# Patient Record
Sex: FEMALE | Race: WHITE | NOT HISPANIC OR LATINO | Employment: OTHER | URBAN - NONMETROPOLITAN AREA
[De-identification: names, ages, dates, MRNs, and addresses within clinical notes are randomized per-mention and may not be internally consistent; named-entity substitution may affect disease eponyms.]

---

## 2020-01-28 ENCOUNTER — TRANSCRIBE ORDERS (OUTPATIENT)
Dept: PODIATRY | Facility: CLINIC | Age: 30
End: 2020-01-28

## 2020-01-28 DIAGNOSIS — L60.8 DISCOLORATION OF NAIL: Primary | ICD-10-CM

## 2020-03-06 ENCOUNTER — OFFICE VISIT (OUTPATIENT)
Dept: PODIATRY | Facility: CLINIC | Age: 30
End: 2020-03-06

## 2020-03-06 VITALS — WEIGHT: 203 LBS | BODY MASS INDEX: 37.36 KG/M2 | OXYGEN SATURATION: 99 % | HEART RATE: 95 BPM | HEIGHT: 62 IN

## 2020-03-06 DIAGNOSIS — B35.1 ONYCHOMYCOSIS: Primary | ICD-10-CM

## 2020-03-06 PROCEDURE — 11755 BIOPSY NAIL UNIT: CPT | Performed by: PODIATRIST

## 2020-03-06 PROCEDURE — 99203 OFFICE O/P NEW LOW 30 MIN: CPT | Performed by: PODIATRIST

## 2020-03-06 NOTE — PROGRESS NOTES
Marjan Hall  1990  29 y.o. female     Patient presents today with a complaint of her right great toe having some skin issues around the nail bed.    03/06/2020    Chief Complaint   Patient presents with   • Right Foot - nail issue       History of Present Illness    Marjan Hall is a 29 y.o.female who presents to clinic today with chief complaint of right great toenail issue.  Patient states that the nail is thickened, discolored and crumbly.  This started approximately 2 years ago.  She does relate to injury to both great toes.  Toenail has progressively gotten worse over time.  It is not painful.  She has done nothing to treat it.  Patient does have history of psoriasis.  She has no other lower extremity complaints.      Past Medical History:   Diagnosis Date   • Psoriasis          Past Surgical History:   Procedure Laterality Date   • PILONIDAL CYST / SINUS EXCISION           Family History   Problem Relation Age of Onset   • Hypertension Father    • Cancer Maternal Grandmother    • Psoriasis Maternal Grandmother    • Diabetes Paternal Grandmother    • Heart disease Paternal Grandmother    • Hypertension Paternal Grandmother        Allergies   Allergen Reactions   • Gluten Meal Other (See Comments)   • Hydrocodone-Acetaminophen GI Intolerance   • Lactose Other (See Comments)   • Sulfa Antibiotics Rash       Social History     Socioeconomic History   • Marital status: Single     Spouse name: Not on file   • Number of children: Not on file   • Years of education: Not on file   • Highest education level: Not on file   Tobacco Use   • Smoking status: Former Smoker   • Smokeless tobacco: Never Used   Substance and Sexual Activity   • Alcohol use: Yes   • Drug use: Never   • Sexual activity: Defer         No current outpatient medications on file.     No current facility-administered medications for this visit.        Review of Systems   Constitutional: Negative.    HENT: Negative.    Eyes:  "Negative.    Respiratory: Negative.    Cardiovascular: Negative.    Gastrointestinal: Negative.    Endocrine: Negative.    Genitourinary: Negative.    Musculoskeletal: Positive for arthralgias.        Joint pain  General aches and pains     Psychiatric/Behavioral: Negative.          OBJECTIVE    Pulse 95   Ht 157.5 cm (62\")   Wt 92.1 kg (203 lb)   SpO2 99%   BMI 37.13 kg/m²       Physical Exam   Constitutional: She is oriented to person, place, and time. She appears well-developed and well-nourished. No distress.   HENT:   Head: Normocephalic and atraumatic.   Nose: Nose normal.   Eyes: Pupils are equal, round, and reactive to light. Conjunctivae and EOM are normal.   Pulmonary/Chest: Effort normal. No respiratory distress. She has no wheezes.   Musculoskeletal: Normal range of motion. She exhibits no edema or deformity.   Neurological: She is alert and oriented to person, place, and time.   Skin: Skin is warm and dry. Capillary refill takes less than 2 seconds.   Psychiatric: She has a normal mood and affect. Her behavior is normal.   Vitals reviewed.      Gait: normal     Assistive Device: none     Lower Extremity    Cardiovascular:    DP/PT pulses palpable bilateral  CFT brisk  to all digits  Skin temp is warm to warm from proximal tibia to distal digits bilateral  Pedal hair growth present.   No erythema or edema noted   Musculoskeletal:  Muscle strength is 5/5 for all muscle groups tested   ROM of the 1st MTP is WNL bilateral   ROM of the MTJ is WNL bilateral   ROM of the STJ is WNL bilateral   ROM of the ankle joint is  WNL bilateral   No pain on palpation noted bilateral  Dermatological:   Skin is warm, dry and intact bilateral  Webspaces 1-4 bilateral are clean, dry and intact.   No subcutaneous nodules or masses noted    Right hallux nail is thickened, discolored, elongated with subungual debris.  Neurological:   Protective sensation intact   Sensation intact to light touch        Procedures    Right " hallux nail biopsy  03/06/20  11:41 AM  Risks and benefits discussed  Part of nail plate removed with nail nippers  Nail bed scrapings taken with dermal curette  Specimen will be sent to [facility]  Patient tolerated the procedure well with no complications     ASSESSMENT AND PLAN    Marjan was seen today for nail issue.    Diagnoses and all orders for this visit:    Onychomycosis      - Comprehensive foot and ankle exam performed.  - Diagnosis, prevention and treatment of fungal toenails was discussed with the patient in detail.  Nail biopsy and treatment with oral fungal versus nail avulsions both temporary permanent versus regular debridements were discussed.  - Patient elected for nail biopsy at this time.  - All questions were answered and the patient is in agreement with the current treatment plan.  - RTC in 3 weeks          This document has been electronically signed by Yonatan Pastor DPM on March 6, 2020 11:41 AM     3/6/2020  11:41 AM

## 2020-03-23 ENCOUNTER — TELEPHONE (OUTPATIENT)
Dept: PODIATRY | Facility: CLINIC | Age: 30
End: 2020-03-23

## 2020-03-23 NOTE — TELEPHONE ENCOUNTER
PT RESCHEDULED UNTIL April.  WANTS A CALL BACK WITH HER TEST RESULTS IF POSSIBLE.  PLEASE CALL AT [phone]

## 2022-06-16 ENCOUNTER — INITIAL PRENATAL (OUTPATIENT)
Dept: OBSTETRICS AND GYNECOLOGY | Facility: CLINIC | Age: 32
End: 2022-06-16

## 2022-06-16 ENCOUNTER — LAB (OUTPATIENT)
Dept: LAB | Facility: HOSPITAL | Age: 32
End: 2022-06-16

## 2022-06-16 VITALS — BODY MASS INDEX: 34.93 KG/M2 | DIASTOLIC BLOOD PRESSURE: 80 MMHG | WEIGHT: 191 LBS | SYSTOLIC BLOOD PRESSURE: 140 MMHG

## 2022-06-16 DIAGNOSIS — Z12.4 SCREENING FOR MALIGNANT NEOPLASM OF CERVIX: ICD-10-CM

## 2022-06-16 DIAGNOSIS — L40.9 PSORIASIS: ICD-10-CM

## 2022-06-16 DIAGNOSIS — O09.291: ICD-10-CM

## 2022-06-16 DIAGNOSIS — Z36.89 ENCOUNTER FOR OTHER SPECIFIED ANTENATAL SCREENING: Primary | ICD-10-CM

## 2022-06-16 DIAGNOSIS — Z3A.01 LESS THAN 8 WEEKS GESTATION OF PREGNANCY: ICD-10-CM

## 2022-06-16 DIAGNOSIS — O10.911 MATERNAL CHRONIC HYPERTENSION IN FIRST TRIMESTER: ICD-10-CM

## 2022-06-16 DIAGNOSIS — O09.41 HIGH RISK MULTIGRAVIDA IN FIRST TRIMESTER: ICD-10-CM

## 2022-06-16 DIAGNOSIS — O10.911 MATERNAL CHRONIC HYPERTENSION IN FIRST TRIMESTER: Primary | ICD-10-CM

## 2022-06-16 LAB
AMPHET+METHAMPHET UR QL: NEGATIVE
AMPHETAMINES UR QL: NEGATIVE
B-HCG UR QL: POSITIVE
BARBITURATES UR QL SCN: NEGATIVE
BENZODIAZ UR QL SCN: NEGATIVE
BUPRENORPHINE SERPL-MCNC: NEGATIVE NG/ML
CANDIDA ALBICANS: NEGATIVE
CANNABINOIDS SERPL QL: NEGATIVE
COCAINE UR QL: NEGATIVE
EXPIRATION DATE: ABNORMAL
GARDNERELLA VAGINALIS: POSITIVE
INTERNAL NEGATIVE CONTROL: NEGATIVE
INTERNAL POSITIVE CONTROL: POSITIVE
Lab: ABNORMAL
Lab: NORMAL
METHADONE UR QL SCN: NEGATIVE
OPIATES UR QL: POSITIVE
OXYCODONE UR QL SCN: NEGATIVE
PCP UR QL SCN: NEGATIVE
PROPOXYPH UR QL: NEGATIVE
T VAGINALIS DNA VAG QL PROBE+SIG AMP: NEGATIVE
TRICYCLICS UR QL SCN: NEGATIVE

## 2022-06-16 PROCEDURE — G0480 DRUG TEST DEF 1-7 CLASSES: HCPCS | Performed by: NURSE PRACTITIONER

## 2022-06-16 PROCEDURE — 80306 DRUG TEST PRSMV INSTRMNT: CPT | Performed by: NURSE PRACTITIONER

## 2022-06-16 PROCEDURE — 81001 URINALYSIS AUTO W/SCOPE: CPT | Performed by: NURSE PRACTITIONER

## 2022-06-16 PROCEDURE — 87660 TRICHOMONAS VAGIN DIR PROBE: CPT | Performed by: NURSE PRACTITIONER

## 2022-06-16 PROCEDURE — 87661 TRICHOMONAS VAGINALIS AMPLIF: CPT | Performed by: NURSE PRACTITIONER

## 2022-06-16 PROCEDURE — 81025 URINE PREGNANCY TEST: CPT | Performed by: NURSE PRACTITIONER

## 2022-06-16 PROCEDURE — 87491 CHLMYD TRACH DNA AMP PROBE: CPT | Performed by: NURSE PRACTITIONER

## 2022-06-16 PROCEDURE — 99204 OFFICE O/P NEW MOD 45 MIN: CPT | Performed by: NURSE PRACTITIONER

## 2022-06-16 PROCEDURE — 36415 COLL VENOUS BLD VENIPUNCTURE: CPT

## 2022-06-16 PROCEDURE — 84156 ASSAY OF PROTEIN URINE: CPT | Performed by: NURSE PRACTITIONER

## 2022-06-16 PROCEDURE — 86803 HEPATITIS C AB TEST: CPT | Performed by: NURSE PRACTITIONER

## 2022-06-16 PROCEDURE — 80053 COMPREHEN METABOLIC PANEL: CPT | Performed by: NURSE PRACTITIONER

## 2022-06-16 PROCEDURE — 87591 N.GONORRHOEAE DNA AMP PROB: CPT | Performed by: NURSE PRACTITIONER

## 2022-06-16 PROCEDURE — 80081 OBSTETRIC PANEL INC HIV TSTG: CPT | Performed by: NURSE PRACTITIONER

## 2022-06-16 PROCEDURE — 82570 ASSAY OF URINE CREATININE: CPT | Performed by: NURSE PRACTITIONER

## 2022-06-16 PROCEDURE — 87480 CANDIDA DNA DIR PROBE: CPT | Performed by: NURSE PRACTITIONER

## 2022-06-16 PROCEDURE — 87510 GARDNER VAG DNA DIR PROBE: CPT | Performed by: NURSE PRACTITIONER

## 2022-06-16 NOTE — PROGRESS NOTES
[facility]  Obstetrics  Date of Service: 2022    CHIEF COMPLAINT:  New prenatal visit    HISTORY OF PRESENT ILLNESS:  Marjan Hall is a 31 y.o. y/o  at 6w4d by LMP (Patient's last menstrual period was 2022 (approximate).).  This was a planned pregnancy and the patient is supported by her parter.  Reports  nausea without vomiting.  Reports breast tenderness.  She denies any vaginal bleeding.  She has not started taking a prenatal vitamin.    REVIEW OF SYSTEMS  Review of Systems   Constitutional: Negative for activity change, appetite change, chills, diaphoresis, fatigue, fever, unexpected weight gain and unexpected weight loss.   Respiratory: Negative for chest tightness and shortness of breath.    Cardiovascular: Negative for chest pain and palpitations.   Gastrointestinal: Positive for nausea. Negative for abdominal distention, abdominal pain, constipation, diarrhea and vomiting.   Genitourinary: Negative for amenorrhea, breast discharge, breast lump, breast pain, decreased libido, decreased urine volume, dyspareunia, dysuria, frequency, menstrual problem, pelvic pain, pelvic pressure, urgency, urinary incontinence, vaginal bleeding, vaginal discharge and vaginal pain.   Musculoskeletal: Negative for myalgias.   Skin: Negative for color change, dry skin and skin lesions.   Neurological: Negative for light-headedness and headache.   Psychiatric/Behavioral: Negative for agitation, dysphoric mood, sleep disturbance, depressed mood and stress. The patient is not nervous/anxious.        PRENATAL RISK FACTORS   Problems (from 22 to present)     Problem Noted Resolved    High risk multigravida in first trimester 2022 by Kathie Omalley, JOSE MARTIN No    History of retained placenta in prior pregnancy, currently pregnant in first trimester 2022 by Kathie Omalley, APRN No    Maternal chronic hypertension in first trimester 2022 by Kathie Omalley, APRN  No          DATING CRITERIA:  LMP (uncertain, appx ) -- ABISAI 23  1TUS scheduled for     OBSTETRIC HISTORY:  OB History    Para Term  AB Living   5 1 1   3 1   SAB IAB Ectopic Molar Multiple Live Births             1      # Outcome Date GA Lbr Jae/2nd Weight Sex Delivery Anes PTL Lv   5 Current            4 AB            3 AB            2 AB            1 Term              GYN HISTORY:  Denies h/o sexually transmitted infections/pelvic inflammatory disease  Denies h/o abnormal pap smears  Last pap smear:   Last Completed Pap Smear          Ordered - PAP SMEAR (Every 3 Years) Ordered on 2022  LIQUID-BASED PAP SMEAR, [facility] ([facility])    2008  Converted (Historical) Gyn Cytology              Denies h/o gynecologic surgeries, including biopsies of the cervix    PAST MEDICAL HISTORY:  Past Medical History:   Diagnosis Date   • Psoriasis      PAST SURGICAL HISTORY:  Past Surgical History:   Procedure Laterality Date   • PILONIDAL CYST / SINUS EXCISION       FAMILY HISTORY:  Family History   Problem Relation Age of Onset   • Hypertension Father    • Cancer Maternal Grandmother    • Psoriasis Maternal Grandmother    • Diabetes Paternal Grandmother    • Heart disease Paternal Grandmother    • Hypertension Paternal Grandmother      SOCIAL HISTORY:  Social History     Socioeconomic History   • Marital status: Significant Other   Tobacco Use   • Smoking status: Former Smoker   • Smokeless tobacco: Never Used   Substance and Sexual Activity   • Alcohol use: Not Currently   • Drug use: Never   • Sexual activity: Defer     GENETIC SCREENING:  Age >34 yo as of ABISAI: no  Thalassemia: no  NTD: no  CHD: no  Down Syndrome/MR/Fragile X/Autism: no  Ashkenazi Yazidism with Berry-Sachs, [place], familial dysautonomia: no  Sickle cell disease or trait: no  Hemophilia: no  Muscular dystrophy: no  Cystic fibrosis: no  [place]'s chorea: no  Birth defects: no  Genetic/chromosomal disorders:  no    INFECTION HISTORY:  TB exposure: no  HSV: no  Illness since LMP: no  Prior GBS infected child: no  STIs: no    ALLERGIES:  Allergies   Allergen Reactions   • Clindamycin/Lincomycin Shortness Of Breath, Dizziness, Cough and Myalgia   • Gluten Meal Other (See Comments)   • Hydrocodone-Acetaminophen GI Intolerance   • Lactose Other (See Comments)   • Sulfa Antibiotics Rash       MEDICATIONS:  Prior to Admission medications    Not on File       PHYSICAL EXAM:   /80   Wt 86.6 kg (191 lb)   LMP 05/01/2022 (Approximate)   BMI 34.93 kg/m²   General: Alert, healthy, no distress, well nourished and well developed.  Neurologic: Alert, oriented to person, place, and time.  Gait normal.  Cranial nerves II-XII grossly intact.  HEENT: Normocephalic, atraumatic.  Extraocular muscles intact, pupils equal and reactive x2.    Teeth: Normal hygiene.  Neck: Supple, no adenopathy, thyroid normal size, non-tender, without nodularity, trachea midline.  Breasts: No masses, skin dimpling, skin retraction, nipple discharge, or asymmetry bilaterally.  Lungs: Normal respiratory effort.  Clear to auscultation bilaterally.  No wheezes, rhonci, or rales.  Heart: Regular rate and rhythm.  No murmer, rub or gallop.  Abdomen: Soft, non-tender, non-distended,no masses, no hepatosplenomegaly, no hernia.  Skin: No rash, no lesions.  Extremities: No cyanosis, clubbing or edema.  PELVIC EXAM:  External Genitalia/Vulva: Anatomy is normal, no significant redness of labia, no discharge on vulvar tissues, Sherrard's and Bartholin's glands are normal, no ulcers, no condylomatous lesions.  Urethra: Normal, no lesions.  Vagina: Vaginal tissues are not inflamed, normal color and texture, no significant discharge present.  Cervix: Normal in appearance without lesions or purulent discharge, no cervical motion tenderness. PAP OBTAINED  Uterus: Normal size, shape, and consistency.  Adnexa: Normal size and shape bilaterally, no palpable mass bilaterally  and non-tender bilaterally.    Bedside ultrasound performed by myself which shows the findings below:  Single, viable IUP around 6-7 weeks gestation with HFR of 157bpm.      IMPRESSION:  Marjan Hall is a 31 y.o.  at 6w4d for a new prenatal visit.    PLAN:  1.  IUP at 6w4d  - Options counseling performed and patient desires continuation of pregnancy to term   - Prenatal labs ordered  - Genetic testing, including cystic fibrosis, was discussed and patient is considering  - Start prenatal vitamins  - Weight gain counseling performed.   - Pregravid BMI 25-29.9: Recommend 15-25 lb  - Return to clinic in 4 weeks for return prenatal visit  - Reviewed COVID-19 visitation policy  - Reviewed COVID-19 precautions     Diagnosis Plan   1. Encounter for other specified  screening  Chlamydia trachomatis, Neisseria gonorrhoeae, Trichomonas vaginalis, PCR - Swab, Cervix    OB Panel With HIV    Urinalysis With Culture If Indicated - Urine, Clean Catch    Urine Drug Screen - Urine, Clean Catch    Gardnerella vaginalis, Trichomonas vaginalis, Candida albicans, DNA - Swab, Vagina    US Ob Transvaginal    POC Pregnancy, Urine    PREVIOUS HISTORY    Opiate Class, ToxAssure, UR - Urine, Clean Catch    Urinalysis, Microscopic Only - Urine, Clean Catch    Extra Tubes   2. Screening for malignant neoplasm of cervix  Liquid-based Pap Smear, Screening    LIQUID-BASED PAP SMEAR, [facility] ([facility])   3. Maternal chronic hypertension in first trimester  Comprehensive Metabolic Panel    Protein / Creatinine Ratio, Urine - Urine, Clean Catch    POC Pregnancy, Urine   4. High risk multigravida in first trimester     5. History of retained placenta in prior pregnancy, currently pregnant in first trimester     6. Less than 8 weeks gestation of pregnancy       JOSE MARTIN Johnson  2022  08:29 CDT

## 2022-06-17 LAB
ABO GROUP BLD: NORMAL
ALBUMIN SERPL-MCNC: 4.6 G/DL (ref 3.5–5.2)
ALBUMIN/GLOB SERPL: 1.7 G/DL
ALP SERPL-CCNC: 49 U/L (ref 39–117)
ALT SERPL W P-5'-P-CCNC: 16 U/L (ref 1–33)
ANION GAP SERPL CALCULATED.3IONS-SCNC: 12.6 MMOL/L (ref 5–15)
AST SERPL-CCNC: 17 U/L (ref 1–32)
BACTERIA UR QL AUTO: NORMAL /HPF
BASOPHILS # BLD AUTO: 0.05 10*3/MM3 (ref 0–0.2)
BASOPHILS NFR BLD AUTO: 0.5 % (ref 0–1.5)
BILIRUB SERPL-MCNC: 0.3 MG/DL (ref 0–1.2)
BILIRUB UR QL STRIP: NEGATIVE
BLD GP AB SCN SERPL QL: NEGATIVE
BUN SERPL-MCNC: 3 MG/DL (ref 6–20)
BUN/CREAT SERPL: 5.9 (ref 7–25)
C TRACH RRNA CVX QL NAA+PROBE: NEGATIVE
CALCIUM SPEC-SCNC: 9 MG/DL (ref 8.6–10.5)
CHLORIDE SERPL-SCNC: 102 MMOL/L (ref 98–107)
CLARITY UR: CLEAR
CO2 SERPL-SCNC: 20.4 MMOL/L (ref 22–29)
COLOR UR: YELLOW
CREAT SERPL-MCNC: 0.51 MG/DL (ref 0.57–1)
CREAT UR-MCNC: 7.3 MG/DL
DEPRECATED RDW RBC AUTO: 38.1 FL (ref 37–54)
EGFRCR SERPLBLD CKD-EPI 2021: 126.6 ML/MIN/1.73
EOSINOPHIL # BLD AUTO: 0.26 10*3/MM3 (ref 0–0.4)
EOSINOPHIL NFR BLD AUTO: 2.7 % (ref 0.3–6.2)
ERYTHROCYTE [DISTWIDTH] IN BLOOD BY AUTOMATED COUNT: 11.9 % (ref 12.3–15.4)
GLOBULIN UR ELPH-MCNC: 2.7 GM/DL
GLUCOSE SERPL-MCNC: 77 MG/DL (ref 65–99)
GLUCOSE UR STRIP-MCNC: NEGATIVE MG/DL
HBV SURFACE AG SERPL QL IA: NORMAL
HCT VFR BLD AUTO: 35.7 % (ref 34–46.6)
HCV AB SER DONR QL: NORMAL
HGB BLD-MCNC: 12.8 G/DL (ref 12–15.9)
HGB UR QL STRIP.AUTO: NEGATIVE
HIV1+2 AB SER QL: NORMAL
HOLD SPECIMEN: NORMAL
HYALINE CASTS UR QL AUTO: NORMAL /LPF
IMM GRANULOCYTES # BLD AUTO: 0.04 10*3/MM3 (ref 0–0.05)
IMM GRANULOCYTES NFR BLD AUTO: 0.4 % (ref 0–0.5)
KETONES UR QL STRIP: NEGATIVE
LEUKOCYTE ESTERASE UR QL STRIP.AUTO: ABNORMAL
LYMPHOCYTES # BLD AUTO: 2.08 10*3/MM3 (ref 0.7–3.1)
LYMPHOCYTES NFR BLD AUTO: 21.8 % (ref 19.6–45.3)
MCH RBC QN AUTO: 32.3 PG (ref 26.6–33)
MCHC RBC AUTO-ENTMCNC: 35.9 G/DL (ref 31.5–35.7)
MCV RBC AUTO: 90.2 FL (ref 79–97)
MONOCYTES # BLD AUTO: 0.74 10*3/MM3 (ref 0.1–0.9)
MONOCYTES NFR BLD AUTO: 7.7 % (ref 5–12)
N GONORRHOEA RRNA SPEC QL NAA+PROBE: NEGATIVE
NEUTROPHILS NFR BLD AUTO: 6.38 10*3/MM3 (ref 1.7–7)
NEUTROPHILS NFR BLD AUTO: 66.9 % (ref 42.7–76)
NITRITE UR QL STRIP: NEGATIVE
NRBC BLD AUTO-RTO: 0 /100 WBC (ref 0–0.2)
PH UR STRIP.AUTO: 7.5 [PH] (ref 5–8)
PLATELET # BLD AUTO: 236 10*3/MM3 (ref 140–450)
PMV BLD AUTO: 11.3 FL (ref 6–12)
POTASSIUM SERPL-SCNC: 3.9 MMOL/L (ref 3.5–5.2)
PROT ?TM UR-MCNC: <4 MG/DL
PROT SERPL-MCNC: 7.3 G/DL (ref 6–8.5)
PROT UR QL STRIP: NEGATIVE
PROT/CREAT UR: NORMAL MG/G{CREAT}
RBC # BLD AUTO: 3.96 10*6/MM3 (ref 3.77–5.28)
RBC # UR STRIP: NORMAL /HPF
REF LAB TEST METHOD: NORMAL
RH BLD: POSITIVE
RPR SER QL: NORMAL
SODIUM SERPL-SCNC: 135 MMOL/L (ref 136–145)
SP GR UR STRIP: <1.005 (ref 1–1.03)
SQUAMOUS #/AREA URNS HPF: NORMAL /HPF
TRICHOMONAS VAGINALIS PCR: NEGATIVE
UROBILINOGEN UR QL STRIP: ABNORMAL
WBC # UR STRIP: NORMAL /HPF
WBC NRBC COR # BLD: 9.55 10*3/MM3 (ref 3.4–10.8)

## 2022-06-18 LAB — RUBV IGG SERPL IA-ACNC: 1.47 INDEX

## 2022-06-20 DIAGNOSIS — Z36.89 ENCOUNTER FOR OTHER SPECIFIED ANTENATAL SCREENING: ICD-10-CM

## 2022-06-20 DIAGNOSIS — Z12.4 SCREENING FOR MALIGNANT NEOPLASM OF CERVIX: Primary | ICD-10-CM

## 2022-06-21 ENCOUNTER — TELEPHONE (OUTPATIENT)
Dept: OBSTETRICS AND GYNECOLOGY | Facility: CLINIC | Age: 32
End: 2022-06-21

## 2022-06-21 RX ORDER — METRONIDAZOLE 500 MG/1
500 TABLET ORAL 2 TIMES DAILY
Qty: 14 TABLET | Refills: 0 | Status: SHIPPED | OUTPATIENT
Start: 2022-06-21 | End: 2022-06-23

## 2022-06-21 NOTE — TELEPHONE ENCOUNTER
----- Message from JOSE MARTIN Peters sent at 6/20/2022  5:19 PM CDT -----  Covering for Kathie... Pt has BV; please order Flagyl 500mg BID for 7 days.

## 2022-06-22 ENCOUNTER — TELEPHONE (OUTPATIENT)
Dept: OBSTETRICS AND GYNECOLOGY | Facility: CLINIC | Age: 32
End: 2022-06-22

## 2022-06-22 NOTE — TELEPHONE ENCOUNTER
PT said the pharmacy told her she should probably not take the prescription given yesterday since she is pregnant. She wanted to see if there was something else she can take. PT can be reached at the number on file to discuss further is necessary.

## 2022-06-23 ENCOUNTER — TELEPHONE (OUTPATIENT)
Dept: OBSTETRICS AND GYNECOLOGY | Facility: CLINIC | Age: 32
End: 2022-06-23

## 2022-06-23 LAB — REF LAB TEST METHOD: NORMAL

## 2022-06-23 RX ORDER — CLINDAMYCIN HYDROCHLORIDE 300 MG/1
300 CAPSULE ORAL 2 TIMES DAILY
Qty: 14 CAPSULE | Refills: 0 | Status: SHIPPED | OUTPATIENT
Start: 2022-06-23 | End: 2022-06-27

## 2022-06-23 NOTE — TELEPHONE ENCOUNTER
Spoke to Rena regarding Flagyl rx for BV. Discussed risk and benefits of treatment and no treatment for symptomatic BV in pregnancy. Discussed risks and benefits of treatment options of Flagyl and Clindamycin. Pt desires Clindamycin PO antibiotics at this time. Rx sent to pharmacy.

## 2022-06-26 LAB
CODEINE/CREAT UR: 971 NG/MG CREAT
DHC/CREAT UR: NOT DETECTED NG/MG CREAT
HYDROCODONE/CREAT UR: NOT DETECTED NG/MG CREAT
HYDROMORPHONE/CREAT UR: NOT DETECTED NG/MG CREAT
LEVEL OF DETECTION:: NORMAL
MORPHINE/CREAT UR: NOT DETECTED NG/MG CREAT
NORCODEINE/CREAT UR CFM: NOT DETECTED NG/MG CREAT
NORHYDROCODONE/CREAT UR: NOT DETECTED NG/MG CREAT
NORMORPHINE UR-MCNC: NOT DETECTED NG/MG CREAT
OPIATES UR QL CFM: NORMAL

## 2022-06-28 PROBLEM — O09.291: Status: ACTIVE | Noted: 2022-06-28

## 2022-06-28 PROBLEM — O09.41 HIGH RISK MULTIGRAVIDA IN FIRST TRIMESTER: Status: ACTIVE | Noted: 2022-06-28

## 2022-07-14 ENCOUNTER — ROUTINE PRENATAL (OUTPATIENT)
Dept: OBSTETRICS AND GYNECOLOGY | Facility: CLINIC | Age: 32
End: 2022-07-14

## 2022-07-14 VITALS — WEIGHT: 185 LBS | BODY MASS INDEX: 33.84 KG/M2 | DIASTOLIC BLOOD PRESSURE: 82 MMHG | SYSTOLIC BLOOD PRESSURE: 120 MMHG

## 2022-07-14 DIAGNOSIS — O10.911 MATERNAL CHRONIC HYPERTENSION IN FIRST TRIMESTER: ICD-10-CM

## 2022-07-14 DIAGNOSIS — O09.41 HIGH RISK MULTIGRAVIDA IN FIRST TRIMESTER: Primary | ICD-10-CM

## 2022-07-14 DIAGNOSIS — O09.291: ICD-10-CM

## 2022-07-14 DIAGNOSIS — Z3A.10 10 WEEKS GESTATION OF PREGNANCY: ICD-10-CM

## 2022-07-14 PROCEDURE — 99213 OFFICE O/P EST LOW 20 MIN: CPT | Performed by: NURSE PRACTITIONER

## 2022-07-14 NOTE — PROGRESS NOTES
CC: Prenatal visit    Marjan Hall is a 31 y.o.  at 10w2d.  Doing well.  Denies N/V, dysuria, abnormal vaginal d/c, headaches, heartburn, constipation, cramping, LOF, or VB.      /82   Wt 83.9 kg (185 lb)   LMP 2022 (Approximate)   BMI 33.84 kg/m²   SVE: 4     Fetal Heart Rate: +vscan     Problems (from 22 to present)     Problem Noted Resolved    High risk multigravida in first trimester 2022 by Kathie Omalley APRN No    History of retained placenta in prior pregnancy, currently pregnant in first trimester 2022 by Kathie Omalley APRN No    Maternal chronic hypertension in first trimester 2022 by Kathie Omalley APRN No          A/P: Marjan Hall is a 31 y.o.  at 10w2d.  - RTC in 4 weeks  - Reviewed COVID-19 visitation policy  - Reviewed COVID-19 precautions     Diagnosis Plan   1. High risk multigravida in first trimester     2. Maternal chronic hypertension in first trimester     3. History of retained placenta in prior pregnancy, currently pregnant in first trimester     4. 10 weeks gestation of pregnancy       JSOE MARTIN Johnson  2022  13:34 CDT

## 2022-08-11 ENCOUNTER — ROUTINE PRENATAL (OUTPATIENT)
Dept: OBSTETRICS AND GYNECOLOGY | Facility: CLINIC | Age: 32
End: 2022-08-11

## 2022-08-11 VITALS — SYSTOLIC BLOOD PRESSURE: 120 MMHG | BODY MASS INDEX: 35.48 KG/M2 | WEIGHT: 194 LBS | DIASTOLIC BLOOD PRESSURE: 60 MMHG

## 2022-08-11 DIAGNOSIS — R82.5 POSITIVE URINE DRUG SCREEN: ICD-10-CM

## 2022-08-11 DIAGNOSIS — O10.912 MATERNAL CHRONIC HYPERTENSION IN SECOND TRIMESTER: ICD-10-CM

## 2022-08-11 DIAGNOSIS — Z36.3 ENCOUNTER FOR ANTENATAL SCREENING FOR MALFORMATION USING ULTRASOUND: ICD-10-CM

## 2022-08-11 DIAGNOSIS — O09.292 HISTORY OF RETAINED PLACENTA IN PRIOR PREGNANCY, CURRENTLY PREGNANT IN SECOND TRIMESTER: ICD-10-CM

## 2022-08-11 DIAGNOSIS — Z3A.14 14 WEEKS GESTATION OF PREGNANCY: ICD-10-CM

## 2022-08-11 DIAGNOSIS — O09.42 HIGH RISK MULTIGRAVIDA IN SECOND TRIMESTER: Primary | ICD-10-CM

## 2022-08-11 PROCEDURE — 99213 OFFICE O/P EST LOW 20 MIN: CPT | Performed by: NURSE PRACTITIONER

## 2022-08-11 RX ORDER — ASPIRIN 81 MG/1
81 TABLET ORAL DAILY
Qty: 90 TABLET | Refills: 2 | Status: SHIPPED | OUTPATIENT
Start: 2022-08-11 | End: 2022-10-12

## 2022-08-11 NOTE — PROGRESS NOTES
CC: Prenatal visit    Marjan Hall is a 31 y.o.  at 14w2d.  Doing well.  Denies dysuria, abnormal vaginal d/c, headaches, heartburn, constipation, cramping, LOF, or VB.  Reports good FM. Reviewed NOB lab results; showed positive for Opiates. Pt denies use of any illegal or legal substances. States that she eats a lot of poppy seeds and that she was on an antibiotic just prior to the UDS but cannot remember which one.    /60   Wt 88 kg (194 lb)   LMP 2022 (Approximate)   BMI 35.48 kg/m²   SVE: NA     Fetal Heart Rate: +vscan     Problems (from 22 to present)     Problem Noted Resolved    Positive urine drug screen 2022 by Kathie Omalley APRN No    Overview Signed 2022  2:12 PM by Kathie Omalley APRN     + opiates but pt denies use of any substances that could cause this to be positive. Notes abx use prior to testing and eats poppy seeds.         High risk multigravida in second trimester 2022 by Kathie Omalley APRN No    History of retained placenta in prior pregnancy, currently pregnant in second trimester 2022 by Kathie Omalley APRN No    Maternal chronic hypertension in second trimester 2022 by Kathie Omalley APRN No          A/P: Marjan Hall is a 31 y.o.  at 14w2d.  - RTC in 4 weeks  - Reviewed COVID-19 visitation policy  - Reviewed COVID-19 precautions     Diagnosis Plan   1. High risk multigravida in second trimester     2. Maternal chronic hypertension in second trimester  US Ob 14 + Weeks Single or First Gestation   3. History of retained placenta in prior pregnancy, currently pregnant in second trimester     4. Encounter for  screening for malformation using ultrasound  US Ob 14 + Weeks Single or First Gestation   5. 14 weeks gestation of pregnancy     6. Positive urine drug screen  Understands that she will continue to be tested and will have a consult on admission to L&D.     Kathie Omalley  APRN  8/11/2022  14:12 CDT

## 2022-09-14 ENCOUNTER — ROUTINE PRENATAL (OUTPATIENT)
Dept: OBSTETRICS AND GYNECOLOGY | Facility: CLINIC | Age: 32
End: 2022-09-14

## 2022-09-14 VITALS — WEIGHT: 191 LBS | SYSTOLIC BLOOD PRESSURE: 138 MMHG | DIASTOLIC BLOOD PRESSURE: 92 MMHG | BODY MASS INDEX: 34.93 KG/M2

## 2022-09-14 DIAGNOSIS — O09.42 HIGH RISK MULTIGRAVIDA IN SECOND TRIMESTER: Primary | ICD-10-CM

## 2022-09-14 DIAGNOSIS — O09.292 HISTORY OF RETAINED PLACENTA IN PRIOR PREGNANCY, CURRENTLY PREGNANT IN SECOND TRIMESTER: ICD-10-CM

## 2022-09-14 DIAGNOSIS — Z36.2 ENCOUNTER FOR OTHER ANTENATAL SCREENING FOLLOW-UP: ICD-10-CM

## 2022-09-14 DIAGNOSIS — Z3A.19 19 WEEKS GESTATION OF PREGNANCY: ICD-10-CM

## 2022-09-14 DIAGNOSIS — O10.912 MATERNAL CHRONIC HYPERTENSION IN SECOND TRIMESTER: ICD-10-CM

## 2022-09-14 DIAGNOSIS — R82.5 POSITIVE URINE DRUG SCREEN: ICD-10-CM

## 2022-09-14 DIAGNOSIS — R30.0 DYSURIA: ICD-10-CM

## 2022-09-14 PROCEDURE — 87660 TRICHOMONAS VAGIN DIR PROBE: CPT | Performed by: NURSE PRACTITIONER

## 2022-09-14 PROCEDURE — 99213 OFFICE O/P EST LOW 20 MIN: CPT | Performed by: NURSE PRACTITIONER

## 2022-09-14 PROCEDURE — 87510 GARDNER VAG DNA DIR PROBE: CPT | Performed by: NURSE PRACTITIONER

## 2022-09-14 PROCEDURE — 87480 CANDIDA DNA DIR PROBE: CPT | Performed by: NURSE PRACTITIONER

## 2022-09-14 PROCEDURE — 81001 URINALYSIS AUTO W/SCOPE: CPT | Performed by: NURSE PRACTITIONER

## 2022-09-14 NOTE — PROGRESS NOTES
CC: Prenatal visit    Marjan Hall is a 32 y.o.  at 19w1d.  Doing well.  Denies abnormal vaginal d/c, headaches, heartburn, constipation, cramping, regular contractions, LOF, or VB.  Reports good FM.    /92   Wt 86.6 kg (191 lb)   LMP 2022 (Approximate)   BMI 34.93 kg/m²   SVE: NA    Anatomy preliminary report reviewed. Subopts noted. Anatomy seen today noted to appear normal at this time. Posterior placenta w/o previa, with normal insertion of a 3VC. TACL- 4.7cm     Fetal Heart Rate: 145     Problems (from 22 to present)     Problem Noted Resolved    Positive urine drug screen 2022 by Kathie Omalley APRN No    Overview Signed 2022  2:12 PM by Kathie Omalley APRN     + opiates but pt denies use of any substances that could cause this to be positive. Notes abx use prior to testing and eats poppy seeds.         High risk multigravida in second trimester 2022 by Kathie Omalley APRN No    History of retained placenta in prior pregnancy, currently pregnant in second trimester 2022 by Kathie Omalley APRN No    Maternal chronic hypertension in second trimester 2022 by Kathie Omalley APRN No    Overview Signed 2022  3:55 PM by Kathie Omalley APRN     Pt refuses ASA               A/P: Marjan Hall is a 32 y.o.  at 19w1d.  - RTC in 3-4 weeks following subopts scan with [facility]  - Reviewed COVID-19 visitation policy  - Reviewed COVID-19 precautions     Diagnosis Plan   1. High risk multigravida in second trimester     2. History of retained placenta in prior pregnancy, currently pregnant in second trimester     3. Maternal chronic hypertension in second trimester     4. Positive urine drug screen     5. Dysuria  Gardnerella vaginalis, Trichomonas vaginalis, Candida albicans, DNA - Swab, Vagina    Urinalysis With Culture If Indicated - Urine, Random Void    Urinalysis, Microscopic Only - Urine, Random Void   6. 19 weeks gestation of  pregnancy       Kathie Omalley, APRN  9/20/2022  15:57 CDT

## 2022-09-15 LAB
BACTERIA UR QL AUTO: NORMAL /HPF
BILIRUB UR QL STRIP: NEGATIVE
CANDIDA ALBICANS: NEGATIVE
CLARITY UR: CLEAR
COLOR UR: YELLOW
GARDNERELLA VAGINALIS: NEGATIVE
GLUCOSE UR STRIP-MCNC: NEGATIVE MG/DL
HGB UR QL STRIP.AUTO: NEGATIVE
HYALINE CASTS UR QL AUTO: NORMAL /LPF
KETONES UR QL STRIP: NEGATIVE
LEUKOCYTE ESTERASE UR QL STRIP.AUTO: ABNORMAL
NITRITE UR QL STRIP: NEGATIVE
PH UR STRIP.AUTO: 7.5 [PH] (ref 5–8)
PROT UR QL STRIP: NEGATIVE
RBC # UR STRIP: NORMAL /HPF
REF LAB TEST METHOD: NORMAL
SP GR UR STRIP: <1.005 (ref 1–1.03)
SQUAMOUS #/AREA URNS HPF: NORMAL /HPF
T VAGINALIS DNA VAG QL PROBE+SIG AMP: NEGATIVE
UROBILINOGEN UR QL STRIP: ABNORMAL
WBC # UR STRIP: NORMAL /HPF

## 2022-09-19 DIAGNOSIS — O10.912 MATERNAL CHRONIC HYPERTENSION IN SECOND TRIMESTER: ICD-10-CM

## 2022-09-19 DIAGNOSIS — Z36.3 ENCOUNTER FOR ANTENATAL SCREENING FOR MALFORMATION USING ULTRASOUND: ICD-10-CM

## 2022-09-28 ENCOUNTER — TELEPHONE (OUTPATIENT)
Dept: OBSTETRICS AND GYNECOLOGY | Facility: CLINIC | Age: 32
End: 2022-09-28

## 2022-09-28 NOTE — TELEPHONE ENCOUNTER
PATIENT CALLED AND WENT TO THE ER YESTERDAY FOR A FALL THAT SHE HAD. SHE CHECKED OUT OK BUT THEY DID TELL HER TO FOLLOW UP WITH HER PROVIDER HERE. HER NUMBER TO CALL BACK [phone].          THANK YOU,        TAE

## 2022-09-28 NOTE — TELEPHONE ENCOUNTER
Spoke to pt regarding fall. Denies vaginal bleeding or decreased FM. Feeling sore today but overall no issues. Precautions reviewed. Keep previously scheduled OB appt for early Oct.

## 2022-10-12 ENCOUNTER — ROUTINE PRENATAL (OUTPATIENT)
Dept: OBSTETRICS AND GYNECOLOGY | Facility: CLINIC | Age: 32
End: 2022-10-12

## 2022-10-12 VITALS — DIASTOLIC BLOOD PRESSURE: 70 MMHG | WEIGHT: 197 LBS | BODY MASS INDEX: 36.03 KG/M2 | SYSTOLIC BLOOD PRESSURE: 124 MMHG

## 2022-10-12 DIAGNOSIS — O10.912 MATERNAL CHRONIC HYPERTENSION IN SECOND TRIMESTER: ICD-10-CM

## 2022-10-12 DIAGNOSIS — O09.42 HIGH RISK MULTIGRAVIDA IN SECOND TRIMESTER: Primary | ICD-10-CM

## 2022-10-12 DIAGNOSIS — O09.292 HISTORY OF RETAINED PLACENTA IN PRIOR PREGNANCY, CURRENTLY PREGNANT IN SECOND TRIMESTER: ICD-10-CM

## 2022-10-12 DIAGNOSIS — R82.5 POSITIVE URINE DRUG SCREEN: ICD-10-CM

## 2022-10-12 DIAGNOSIS — Z3A.23 23 WEEKS GESTATION OF PREGNANCY: ICD-10-CM

## 2022-10-12 PROCEDURE — 99214 OFFICE O/P EST MOD 30 MIN: CPT | Performed by: NURSE PRACTITIONER

## 2022-10-12 NOTE — PROGRESS NOTES
CC: Prenatal visit    Marjan Hall is a 32 y.o.  at 23w1d.  Doing well.  Denies contractions, LOF, or VB.  Reports good FM.    /70   Wt 89.4 kg (197 lb)   LMP 2022 (Approximate)   BMI 36.03 kg/m²   SVE: NA      Prelim anatomy scan f/u for subopts: All subopts seen and noted appear normal at this time. Cervix 3.8cm. FHR: 138. Cephalic. Placenta posterior. LEONARDO.: Subjectively normal. EFW: 616gm, 1lb 6oz  Fetal Heart Rate: 138us     Problems (from 22 to present)     Problem Noted Resolved    Positive urine drug screen 2022 by Kathie Omalley APRN No    Overview Signed 2022  2:12 PM by Kathie Omalley APRN     + opiates but pt denies use of any substances that could cause this to be positive. Notes abx use prior to testing and eats poppy seeds.         High risk multigravida in second trimester 2022 by Kathie Omalley APRN No    History of retained placenta in prior pregnancy, currently pregnant in second trimester 2022 by Kathie Omalley APRN No    Maternal chronic hypertension in second trimester 2022 by Kathie Omalley APRN No    Overview Signed 2022  3:55 PM by Kathie Omalley APRN     Pt refuses ASA               A/P: Marjan Hall is a 32 y.o.  at 23w1d.  - RTC in 4 weeks, 3T labs, glucola and Tdap  - Reviewed COVID-19 visitation policy  - Reviewed COVID-19 precautions     Diagnosis Plan   1. High risk multigravida in second trimester        2. 23 weeks gestation of pregnancy        3. History of retained placenta in prior pregnancy, currently pregnant in second trimester        4. Maternal chronic hypertension in second trimester        5. Positive urine drug screen        Scribed for JOSE MARTIN Johnson by JOSE MARTIN Brand. 10/12/2022  11:56 CDT    JOSE MARTIN Johnson  10/12/2022  11:56 CDT

## 2022-10-13 DIAGNOSIS — Z36.2 ENCOUNTER FOR OTHER ANTENATAL SCREENING FOLLOW-UP: ICD-10-CM

## 2022-11-16 ENCOUNTER — ROUTINE PRENATAL (OUTPATIENT)
Dept: OBSTETRICS AND GYNECOLOGY | Facility: CLINIC | Age: 32
End: 2022-11-16

## 2022-11-16 ENCOUNTER — LAB (OUTPATIENT)
Dept: LAB | Facility: HOSPITAL | Age: 32
End: 2022-11-16

## 2022-11-16 VITALS — SYSTOLIC BLOOD PRESSURE: 130 MMHG | BODY MASS INDEX: 37.13 KG/M2 | DIASTOLIC BLOOD PRESSURE: 70 MMHG | WEIGHT: 203 LBS

## 2022-11-16 DIAGNOSIS — O09.43 HIGH RISK MULTIGRAVIDA IN THIRD TRIMESTER: Primary | ICD-10-CM

## 2022-11-16 DIAGNOSIS — O09.293: ICD-10-CM

## 2022-11-16 DIAGNOSIS — R82.5 POSITIVE URINE DRUG SCREEN: ICD-10-CM

## 2022-11-16 DIAGNOSIS — Z36.89 ENCOUNTER FOR OTHER SPECIFIED ANTENATAL SCREENING: ICD-10-CM

## 2022-11-16 DIAGNOSIS — O10.913 MATERNAL CHRONIC HYPERTENSION IN THIRD TRIMESTER: ICD-10-CM

## 2022-11-16 DIAGNOSIS — O99.810 ABNORMAL GLUCOSE AFFECTING PREGNANCY: Primary | ICD-10-CM

## 2022-11-16 DIAGNOSIS — Z3A.28 28 WEEKS GESTATION OF PREGNANCY: ICD-10-CM

## 2022-11-16 LAB
ALBUMIN SERPL-MCNC: 3.7 G/DL (ref 3.5–5.2)
ALBUMIN/GLOB SERPL: 1.4 G/DL
ALP SERPL-CCNC: 43 U/L (ref 39–117)
ALT SERPL W P-5'-P-CCNC: 12 U/L (ref 1–33)
ANION GAP SERPL CALCULATED.3IONS-SCNC: 11 MMOL/L (ref 5–15)
AST SERPL-CCNC: 14 U/L (ref 1–32)
BILIRUB SERPL-MCNC: 0.2 MG/DL (ref 0–1.2)
BUN SERPL-MCNC: 3 MG/DL (ref 6–20)
BUN/CREAT SERPL: 6.8 (ref 7–25)
CALCIUM SPEC-SCNC: 9 MG/DL (ref 8.6–10.5)
CHLORIDE SERPL-SCNC: 102 MMOL/L (ref 98–107)
CO2 SERPL-SCNC: 23 MMOL/L (ref 22–29)
CREAT SERPL-MCNC: 0.44 MG/DL (ref 0.57–1)
DEPRECATED RDW RBC AUTO: 43.8 FL (ref 37–54)
EGFRCR SERPLBLD CKD-EPI 2021: 130.4 ML/MIN/1.73
ERYTHROCYTE [DISTWIDTH] IN BLOOD BY AUTOMATED COUNT: 12.8 % (ref 12.3–15.4)
GLOBULIN UR ELPH-MCNC: 2.6 GM/DL
GLUCOSE 1H P 100 G GLC PO SERPL-MCNC: 120 MG/DL (ref 65–139)
GLUCOSE SERPL-MCNC: 120 MG/DL (ref 65–99)
HCT VFR BLD AUTO: 32.8 % (ref 34–46.6)
HGB BLD-MCNC: 11.1 G/DL (ref 12–15.9)
MCH RBC QN AUTO: 32.2 PG (ref 26.6–33)
MCHC RBC AUTO-ENTMCNC: 33.8 G/DL (ref 31.5–35.7)
MCV RBC AUTO: 95.1 FL (ref 79–97)
PLATELET # BLD AUTO: 151 10*3/MM3 (ref 140–450)
PMV BLD AUTO: 12 FL (ref 6–12)
POTASSIUM SERPL-SCNC: 3.9 MMOL/L (ref 3.5–5.2)
PROT SERPL-MCNC: 6.3 G/DL (ref 6–8.5)
RBC # BLD AUTO: 3.45 10*6/MM3 (ref 3.77–5.28)
SODIUM SERPL-SCNC: 136 MMOL/L (ref 136–145)
WBC NRBC COR # BLD: 10.68 10*3/MM3 (ref 3.4–10.8)

## 2022-11-16 PROCEDURE — 80053 COMPREHEN METABOLIC PANEL: CPT | Performed by: NURSE PRACTITIONER

## 2022-11-16 PROCEDURE — 99214 OFFICE O/P EST MOD 30 MIN: CPT | Performed by: NURSE PRACTITIONER

## 2022-11-16 PROCEDURE — 82950 GLUCOSE TEST: CPT | Performed by: NURSE PRACTITIONER

## 2022-11-16 PROCEDURE — 85027 COMPLETE CBC AUTOMATED: CPT | Performed by: NURSE PRACTITIONER

## 2022-11-16 NOTE — PROGRESS NOTES
CC: Prenatal visit    Marjan Hall is a 32 y.o.  at 28w1d.  Doing well.  Denies N/V, dysuria, abnormal vaginal d/c, headaches, heartburn, constipation, cramping, regular contractions, LOF, or VB.  Reports good FM.    /70   Wt 92.1 kg (203 lb)   LMP 2022 (Approximate)   BMI 37.13 kg/m²   SVE: NA    F/U scan for subopt anatomy and growth reviewed today. All previously suboptimal images obtained and noted to appear normal at this time. EFW- 1213g, 45%tile. AC- 62%tile. BPP- . LEONARDO- 17.91cm     Fetal Heart Rate: 143     Problems (from 22 to present)     Problem Noted Resolved    Positive urine drug screen 2022 by Kathie Omalley APRN No    Overview Signed 2022  2:12 PM by Kathie Omalley APRN     + opiates but pt denies use of any substances that could cause this to be positive. Notes abx use prior to testing and eats poppy seeds.         High risk multigravida in third trimester 2022 by Kathie Omalley APRN No    History of retained placenta in prior pregnancy, currently pregnant, third trimester 2022 by Kathie Omalley APRN No    Maternal chronic hypertension in third trimester 2022 by Kathie Omalley APRN No    Overview Signed 2022  3:55 PM by Kathie Omalley APRN     Pt refuses ASA               A/P: Marjan Hall is a 32 y.o.  at 28w1d.  - RTC in 4 weeks  - Reviewed COVID-19 visitation policy  - Reviewed COVID-19 precautions     Diagnosis Plan   1. High risk multigravida in third trimester        2. History of retained placenta in prior pregnancy, currently pregnant, third trimester        3. Maternal chronic hypertension in third trimester  Comprehensive Metabolic Panel  PreE precautions reviewed. States that she can request a BP cuff from her insurance; speaks to a rep weekly for pregnancy updates and was told they would send her one anytime she needed it.      4. Positive urine drug screen        5. Encounter for other  specified  screening  CBC (No Diff)    Glucose, Post 50 Gm Glucola      6. 28 weeks gestation of pregnancy          Kathie Omalley, JOSE MARTIN  2022  09:33 CST

## 2022-11-30 DIAGNOSIS — O09.293: ICD-10-CM

## 2022-11-30 DIAGNOSIS — O10.913 MATERNAL CHRONIC HYPERTENSION IN THIRD TRIMESTER: ICD-10-CM

## 2022-11-30 DIAGNOSIS — O09.43 HIGH RISK MULTIGRAVIDA IN THIRD TRIMESTER: ICD-10-CM

## 2022-11-30 DIAGNOSIS — O99.810 ABNORMAL GLUCOSE AFFECTING PREGNANCY: Primary | ICD-10-CM

## 2022-12-01 DIAGNOSIS — O09.43 HIGH RISK MULTIGRAVIDA IN THIRD TRIMESTER: ICD-10-CM

## 2022-12-01 DIAGNOSIS — O09.293: ICD-10-CM

## 2022-12-01 DIAGNOSIS — O10.913 MATERNAL CHRONIC HYPERTENSION IN THIRD TRIMESTER: ICD-10-CM

## 2022-12-01 DIAGNOSIS — O99.810 ABNORMAL GLUCOSE AFFECTING PREGNANCY: ICD-10-CM

## 2022-12-14 ENCOUNTER — ROUTINE PRENATAL (OUTPATIENT)
Dept: OBSTETRICS AND GYNECOLOGY | Facility: CLINIC | Age: 32
End: 2022-12-14

## 2022-12-14 ENCOUNTER — PATIENT MESSAGE (OUTPATIENT)
Dept: OBSTETRICS AND GYNECOLOGY | Facility: CLINIC | Age: 32
End: 2022-12-14

## 2022-12-14 VITALS — WEIGHT: 207 LBS | DIASTOLIC BLOOD PRESSURE: 70 MMHG | SYSTOLIC BLOOD PRESSURE: 126 MMHG | BODY MASS INDEX: 37.86 KG/M2

## 2022-12-14 DIAGNOSIS — Z3A.32 32 WEEKS GESTATION OF PREGNANCY: ICD-10-CM

## 2022-12-14 DIAGNOSIS — O10.913 MATERNAL CHRONIC HYPERTENSION IN THIRD TRIMESTER: ICD-10-CM

## 2022-12-14 DIAGNOSIS — O09.43 HIGH RISK MULTIGRAVIDA IN THIRD TRIMESTER: Primary | ICD-10-CM

## 2022-12-14 DIAGNOSIS — R82.5 POSITIVE URINE DRUG SCREEN: ICD-10-CM

## 2022-12-14 DIAGNOSIS — O09.293: ICD-10-CM

## 2022-12-14 PROCEDURE — 99213 OFFICE O/P EST LOW 20 MIN: CPT | Performed by: NURSE PRACTITIONER

## 2022-12-14 NOTE — TELEPHONE ENCOUNTER
From: Marjan Hall  To: JOSE MARTIN Johnson  Sent: 12/14/2022 11:31 AM CST  Subject: Ultrasound Followup Question    Viet Vázquez,    I was looking over the measurements from today’s ultrasound and had a question. Our baby’s head is measuring 85th percentile and humerus/femur length are 19th and 11th percentile. We talked about him having a big head, but are the proportions ok? Are there any concerns with that in regards to any type of dysplasia? I don’t want to be unnecessarily concerned, and know it’s not a huge difference now but easily could be. How did measurements look for you? Is that still pretty normal-ana? Thank you!!

## 2022-12-14 NOTE — PROGRESS NOTES
CC: Prenatal visit    Marjan Hall is a 32 y.o.  at 32w1d.  Doing well.  Denies N/V, dysuria, abnormal vaginal d/c, headaches, heartburn, constipation, cramping, regular contractions, LOF, or VB.  Reports good FM.    /70   Wt 93.9 kg (207 lb)   LMP 2022 (Approximate)   BMI 37.86 kg/m²   SVE: NA    EFW- 1966g, 47%tile. AC- 62%tile. LEONARDO- 147bpm. BPP-      Fetal Heart Rate: 147     Problems (from 22 to present)     Problem Noted Resolved    Positive urine drug screen 2022 by Kathie Omalley APRN No    Overview Signed 2022  2:12 PM by Kathie Omalley APRN     + opiates but pt denies use of any substances that could cause this to be positive. Notes abx use prior to testing and eats poppy seeds.         High risk multigravida in third trimester 2022 by Kathie Omalley APRN No    History of retained placenta in prior pregnancy, currently pregnant, third trimester 2022 by Kathie Omalley APRN No    Maternal chronic hypertension in third trimester 2022 by Kathie Omalley APRN No    Overview Signed 2022  3:55 PM by Kathie Omalley APRN     Pt refuses ASA               A/P: Marjan Hall is a 32 y.o.  at 32w1d.  - RTC in 1 week  - start checking BP once daily and additionally if feeling unwell. Reviewed s/s of PreE or BP ranges to report.   - Reviewed COVID-19 visitation policy  - Reviewed COVID-19 precautions     Diagnosis Plan   1. High risk multigravida in third trimester        2. History of retained placenta in prior pregnancy, currently pregnant, third trimester        3. Maternal chronic hypertension in third trimester  Continue weekly BPPs and GS q4 wks.      4. Positive urine drug screen        5. 32 weeks gestation of pregnancy  Does not want to be induced unless absolutely medically necessary.         JOSE MARTIN Johsnon  2022  16:15 CST

## 2022-12-21 ENCOUNTER — ROUTINE PRENATAL (OUTPATIENT)
Dept: OBSTETRICS AND GYNECOLOGY | Facility: CLINIC | Age: 32
End: 2022-12-21

## 2022-12-21 VITALS — SYSTOLIC BLOOD PRESSURE: 124 MMHG | BODY MASS INDEX: 38.04 KG/M2 | DIASTOLIC BLOOD PRESSURE: 80 MMHG | WEIGHT: 208 LBS

## 2022-12-21 DIAGNOSIS — O10.913 MATERNAL CHRONIC HYPERTENSION IN THIRD TRIMESTER: ICD-10-CM

## 2022-12-21 DIAGNOSIS — O09.43 HIGH RISK MULTIGRAVIDA IN THIRD TRIMESTER: ICD-10-CM

## 2022-12-21 DIAGNOSIS — O99.810 ABNORMAL GLUCOSE AFFECTING PREGNANCY: ICD-10-CM

## 2022-12-21 DIAGNOSIS — O09.293: ICD-10-CM

## 2022-12-21 DIAGNOSIS — Z3A.33 33 WEEKS GESTATION OF PREGNANCY: ICD-10-CM

## 2022-12-21 DIAGNOSIS — O09.43 HIGH RISK MULTIGRAVIDA IN THIRD TRIMESTER: Primary | ICD-10-CM

## 2022-12-21 DIAGNOSIS — R82.5 POSITIVE URINE DRUG SCREEN: ICD-10-CM

## 2022-12-21 PROCEDURE — 99213 OFFICE O/P EST LOW 20 MIN: CPT | Performed by: NURSE PRACTITIONER

## 2022-12-21 NOTE — PROGRESS NOTES
CC: Prenatal visit    Marjan Hall is a 32 y.o.  at 33w1d.  Doing well.  Denies N/V, dysuria, abnormal vaginal d/c, headaches, heartburn, constipation, cramping, regular contractions, LOF, or VB.  Reports good FM.    States BP at home has been 120-130s/70-80s    BPP- , LEONARDO- 12.32cm    /80   Wt 94.3 kg (208 lb)   LMP 2022 (Approximate)   BMI 38.04 kg/m²   SVE: NA     Fetal Heart Rate: 158     Problems (from 22 to present)     Problem Noted Resolved    Positive urine drug screen 2022 by Kathie Omalley APRN No    Overview Signed 2022  2:12 PM by Kathie Omalley APRN     + opiates but pt denies use of any substances that could cause this to be positive. Notes abx use prior to testing and eats poppy seeds.         High risk multigravida in third trimester 2022 by Kathie Omalley APRN No    History of retained placenta in prior pregnancy, currently pregnant, third trimester 2022 by Kathie Omalley APRN No    Maternal chronic hypertension in third trimester 2022 by Kathie Omalley APRN No    Overview Signed 2022  3:55 PM by Kathie Omalley APRN     Pt refuses ASA               A/P: Marjan Hall is a 32 y.o.  at 33w1d.  - RTC in 2 weeks  - Will have BPP next week but no one will be in office in [facility]. Will have [facility] call for any concerns or send pt to [place].  - Reviewed COVID-19 visitation policy  - Reviewed COVID-19 precautions     Diagnosis Plan   1. High risk multigravida in third trimester        2. History of retained placenta in prior pregnancy, currently pregnant, third trimester        3. Maternal chronic hypertension in third trimester        4. Positive urine drug screen        5. 33 weeks gestation of pregnancy          JOSE MARTIN Johnson  2022  10:20 CST

## 2023-01-04 DIAGNOSIS — O99.810 ABNORMAL GLUCOSE AFFECTING PREGNANCY: ICD-10-CM

## 2023-01-04 DIAGNOSIS — O10.913 MATERNAL CHRONIC HYPERTENSION IN THIRD TRIMESTER: ICD-10-CM

## 2023-01-04 DIAGNOSIS — O09.293: ICD-10-CM

## 2023-01-04 DIAGNOSIS — O09.43 HIGH RISK MULTIGRAVIDA IN THIRD TRIMESTER: Primary | ICD-10-CM

## 2023-01-05 ENCOUNTER — ROUTINE PRENATAL (OUTPATIENT)
Dept: OBSTETRICS AND GYNECOLOGY | Facility: CLINIC | Age: 33
End: 2023-01-05
Payer: MEDICAID

## 2023-01-05 VITALS — BODY MASS INDEX: 38.41 KG/M2 | WEIGHT: 210 LBS | SYSTOLIC BLOOD PRESSURE: 132 MMHG | DIASTOLIC BLOOD PRESSURE: 70 MMHG

## 2023-01-05 DIAGNOSIS — O10.913 MATERNAL CHRONIC HYPERTENSION IN THIRD TRIMESTER: ICD-10-CM

## 2023-01-05 DIAGNOSIS — Z3A.35 35 WEEKS GESTATION OF PREGNANCY: Primary | ICD-10-CM

## 2023-01-05 DIAGNOSIS — R82.5 POSITIVE URINE DRUG SCREEN: ICD-10-CM

## 2023-01-05 DIAGNOSIS — O09.293: ICD-10-CM

## 2023-01-05 DIAGNOSIS — O09.43 HIGH RISK MULTIGRAVIDA IN THIRD TRIMESTER: ICD-10-CM

## 2023-01-05 PROCEDURE — 99213 OFFICE O/P EST LOW 20 MIN: CPT | Performed by: NURSE PRACTITIONER

## 2023-01-05 NOTE — PROGRESS NOTES
CC: Prenatal visit    Marjan Hall is a 32 y.o.  at 35w2d.   Denies N/V, dysuria, abnormal vaginal d/c, headaches, heartburn, constipation, cramping, regular contractions, LOF, or VB.  Reports good FM. Pt was has been sick this past week with cough but improving. Tested negative for flu and covid.    /70   Wt 95.3 kg (210 lb)   LMP 2022 (Approximate)   BMI 38.41 kg/m²   SVE: Deferred  Fundal Height (cm): 36 cm  Fetal Heart Rate: 143     US: BPP . LEONARDO 8.69cm. Cephalic.      Problems (from 22 to present)     Problem Noted Resolved    Positive urine drug screen 2022 by Kathie Omalley APRN No    Overview Signed 2022  2:12 PM by Kathie Omalley APRN     + opiates but pt denies use of any substances that could cause this to be positive. Notes abx use prior to testing and eats poppy seeds.         High risk multigravida in third trimester 2022 by Kathie Omalley APRN No    History of retained placenta in prior pregnancy, currently pregnant, third trimester 2022 by Kathie Omalley APRN No    Maternal chronic hypertension in third trimester 2022 by Kathie Omalley APRN No    Overview Signed 2022  3:55 PM by Kathie Omalley APRN     Pt refuses ASA               A/P: Marjan Hall is a 32 y.o.  at 35w2d.  - Reassuring BPP  - GBS swab next visit  - RTC in 1 week     Diagnosis Plan   1. High risk multigravida in third trimester        2. History of retained placenta in prior pregnancy, currently pregnant, third trimester        3. Maternal chronic hypertension in third trimester        4. Positive urine drug screen        5. 35 weeks gestation of pregnancy          JOSE MARTIN Rosen  2023  09:13 CST

## 2023-01-09 ENCOUNTER — ROUTINE PRENATAL (OUTPATIENT)
Dept: OBSTETRICS AND GYNECOLOGY | Facility: CLINIC | Age: 33
End: 2023-01-09
Payer: MEDICAID

## 2023-01-09 VITALS — DIASTOLIC BLOOD PRESSURE: 80 MMHG | WEIGHT: 212 LBS | SYSTOLIC BLOOD PRESSURE: 126 MMHG | BODY MASS INDEX: 38.78 KG/M2

## 2023-01-09 DIAGNOSIS — O09.293: ICD-10-CM

## 2023-01-09 DIAGNOSIS — O09.43 HIGH RISK MULTIGRAVIDA IN THIRD TRIMESTER: Primary | ICD-10-CM

## 2023-01-09 DIAGNOSIS — O99.810 ABNORMAL GLUCOSE AFFECTING PREGNANCY: ICD-10-CM

## 2023-01-09 DIAGNOSIS — R82.5 POSITIVE URINE DRUG SCREEN: ICD-10-CM

## 2023-01-09 DIAGNOSIS — O10.913 MATERNAL CHRONIC HYPERTENSION IN THIRD TRIMESTER: ICD-10-CM

## 2023-01-09 DIAGNOSIS — O09.43 HIGH RISK MULTIGRAVIDA IN THIRD TRIMESTER: ICD-10-CM

## 2023-01-09 DIAGNOSIS — Z3A.35 35 WEEKS GESTATION OF PREGNANCY: ICD-10-CM

## 2023-01-09 PROCEDURE — 99213 OFFICE O/P EST LOW 20 MIN: CPT | Performed by: NURSE PRACTITIONER

## 2023-01-09 NOTE — PROGRESS NOTES
CC: Prenatal visit    Marjan Hall is a 32 y.o.  at 35w6d.  Doing well.  Denies N/V, dysuria, abnormal vaginal d/c, headaches, heartburn, constipation, cramping, regular contractions, LOF, or VB.  Reports good FM.    /80   Wt 96.2 kg (212 lb)   LMP 2022 (Approximate)   BMI 38.78 kg/m²   SVE: NA    EFW- 2841g, 57%tile. AC- 81%tile. LEONARDO- 11.54cm.   BPP-      Fetal Heart Rate: 145     Problems (from 22 to present)     Problem Noted Resolved    Positive urine drug screen 2022 by Kathie Omalley APRN No    Overview Signed 2022  2:12 PM by Kathie Omalley APRN     + opiates but pt denies use of any substances that could cause this to be positive. Notes abx use prior to testing and eats poppy seeds.         High risk multigravida in third trimester 2022 by Kathie Omalley APRN No    History of retained placenta in prior pregnancy, currently pregnant, third trimester 2022 by Kathie Oamlley APRN No    Maternal chronic hypertension in third trimester 2022 by Kathie Omalley APRN No    Overview Signed 2022  3:55 PM by Kathie Omalley APRN     Pt refuses ASA               A/P: Marjan Hall is a 32 y.o.  at 35w6d.  - RTC in 1 week  - Reviewed COVID-19 visitation policy  - Reviewed COVID-19 precautions     Diagnosis Plan   1. High risk multigravida in third trimester        2. History of retained placenta in prior pregnancy, currently pregnant, third trimester        3. Maternal chronic hypertension in third trimester        4. Positive urine drug screen        5. 35 weeks gestation of pregnancy          JOSE MARTNI Johnson  2023  09:11 CST

## 2023-01-16 ENCOUNTER — ROUTINE PRENATAL (OUTPATIENT)
Dept: OBSTETRICS AND GYNECOLOGY | Facility: CLINIC | Age: 33
End: 2023-01-16
Payer: MEDICAID

## 2023-01-16 VITALS — WEIGHT: 211.4 LBS | SYSTOLIC BLOOD PRESSURE: 144 MMHG | DIASTOLIC BLOOD PRESSURE: 78 MMHG | BODY MASS INDEX: 38.67 KG/M2

## 2023-01-16 DIAGNOSIS — R82.5 POSITIVE URINE DRUG SCREEN: ICD-10-CM

## 2023-01-16 DIAGNOSIS — Z36.85 ANTENATAL SCREENING FOR STREPTOCOCCUS B: ICD-10-CM

## 2023-01-16 DIAGNOSIS — Z3A.36 36 WEEKS GESTATION OF PREGNANCY: ICD-10-CM

## 2023-01-16 DIAGNOSIS — O09.43 HIGH RISK MULTIGRAVIDA IN THIRD TRIMESTER: Primary | ICD-10-CM

## 2023-01-16 DIAGNOSIS — O09.293: ICD-10-CM

## 2023-01-16 DIAGNOSIS — O10.913 MATERNAL CHRONIC HYPERTENSION IN THIRD TRIMESTER: ICD-10-CM

## 2023-01-16 PROCEDURE — 87653 STREP B DNA AMP PROBE: CPT | Performed by: NURSE PRACTITIONER

## 2023-01-16 PROCEDURE — 99213 OFFICE O/P EST LOW 20 MIN: CPT | Performed by: NURSE PRACTITIONER

## 2023-01-16 NOTE — PROGRESS NOTES
CC: Prenatal visit    Marjan Hall is a 32 y.o.  at 36w6d.  Doing well.  Denies N/V, dysuria, abnormal vaginal d/c, headaches, heartburn, constipation, cramping, regular contractions, LOF, or VB.  Reports good FM.    /78   Wt 95.9 kg (211 lb 6.4 oz)   LMP 2022 (Approximate)   BMI 38.67 kg/m²   SVE: closed, soft, posterior, high    BPP- 8/8, LEONARDO- 8.36cm     Fetal Heart Rate: 147     Problems (from 22 to present)     Problem Noted Resolved    Positive urine drug screen 2022 by Kathie Omalley APRN No    Overview Signed 2022  2:12 PM by Kathie Omalley APRN     + opiates but pt denies use of any substances that could cause this to be positive. Notes abx use prior to testing and eats poppy seeds.         High risk multigravida in third trimester 2022 by Kathie Omalley APRN No    History of retained placenta in prior pregnancy, currently pregnant, third trimester 2022 by Kathie Omalley APRN No    Maternal chronic hypertension in third trimester 2022 by Kathie Omalley APRN No    Overview Signed 2022  3:55 PM by Kathie Omalley APRN     Pt refuses ASA               A/P: Marjan Hall is a 32 y.o.  at 36w6d.  - RTC in 1 week  - Reviewed COVID-19 visitation policy  - Reviewed COVID-19 precautions     Diagnosis Plan   1. High risk multigravida in third trimester        2. History of retained placenta in prior pregnancy, currently pregnant, third trimester        3. Maternal chronic hypertension in third trimester        4. Positive urine drug screen        5.  screening for streptococcus B  Group B Strep (Molecular) - Swab, Vaginal/Rectum    Group B Strep (Molecular) - Swab, Vaginal/Rectum      6. 36 weeks gestation of pregnancy          JOSE MARTIN Johnson  2023  10:14 CST

## 2023-01-17 DIAGNOSIS — O99.810 ABNORMAL GLUCOSE AFFECTING PREGNANCY: ICD-10-CM

## 2023-01-17 DIAGNOSIS — O09.293: ICD-10-CM

## 2023-01-17 DIAGNOSIS — O10.913 MATERNAL CHRONIC HYPERTENSION IN THIRD TRIMESTER: ICD-10-CM

## 2023-01-17 DIAGNOSIS — O09.43 HIGH RISK MULTIGRAVIDA IN THIRD TRIMESTER: ICD-10-CM

## 2023-01-17 LAB — GROUP B STREP, DNA: NEGATIVE

## 2023-01-23 ENCOUNTER — ROUTINE PRENATAL (OUTPATIENT)
Dept: OBSTETRICS AND GYNECOLOGY | Facility: CLINIC | Age: 33
End: 2023-01-23
Payer: MEDICAID

## 2023-01-23 VITALS — DIASTOLIC BLOOD PRESSURE: 80 MMHG | BODY MASS INDEX: 39.32 KG/M2 | SYSTOLIC BLOOD PRESSURE: 122 MMHG | WEIGHT: 215 LBS

## 2023-01-23 DIAGNOSIS — Z3A.37 37 WEEKS GESTATION OF PREGNANCY: ICD-10-CM

## 2023-01-23 DIAGNOSIS — O10.913 MATERNAL CHRONIC HYPERTENSION IN THIRD TRIMESTER: ICD-10-CM

## 2023-01-23 DIAGNOSIS — O09.293: ICD-10-CM

## 2023-01-23 DIAGNOSIS — R82.5 POSITIVE URINE DRUG SCREEN: ICD-10-CM

## 2023-01-23 DIAGNOSIS — O09.43 HIGH RISK MULTIGRAVIDA IN THIRD TRIMESTER: Primary | ICD-10-CM

## 2023-01-23 PROCEDURE — 99213 OFFICE O/P EST LOW 20 MIN: CPT | Performed by: NURSE PRACTITIONER

## 2023-01-23 NOTE — PROGRESS NOTES
CC: Prenatal visit    Marjan Hall is a 32 y.o.  at 37w6d.  Doing well.  Denies N/V, dysuria, abnormal vaginal d/c, headaches, heartburn, constipation, cramping, regular contractions, LOF, or VB.  Reports good FM.    /80   Wt 97.5 kg (215 lb)   LMP 2022 (Approximate)   BMI 39.32 kg/m²   SVE: NA    BPP- 8/8, LEONARDO- 8.06cm     Fetal Heart Rate: 141     Problems (from 22 to present)     Problem Noted Resolved    Positive urine drug screen 2022 by Kathie Omalley APRN No    Overview Signed 2022  2:12 PM by Kathie Omalley APRN     + opiates but pt denies use of any substances that could cause this to be positive. Notes abx use prior to testing and eats poppy seeds.         High risk multigravida in third trimester 2022 by Kathie Omalley APRN No    History of retained placenta in prior pregnancy, currently pregnant, third trimester 2022 by Kathie Omalley APRN No    Maternal chronic hypertension in third trimester 2022 by Kathie Omalley APRN No    Overview Signed 2022  3:55 PM by Kathie Omalley APRN     Pt refuses ASA               A/P: Marjan Hall is a 32 y.o.  at 37w6d.  - RTC in 1 week  - Reviewed COVID-19 visitation policy  - Reviewed COVID-19 precautions     Diagnosis Plan   1. High risk multigravida in third trimester        2. History of retained placenta in prior pregnancy, currently pregnant, third trimester        3. Maternal chronic hypertension in third trimester        4. Positive urine drug screen        5. 37 weeks gestation of pregnancy          JOSE MARTIN Johnson  2023  11:25 CST

## 2023-01-27 DIAGNOSIS — O10.913 MATERNAL CHRONIC HYPERTENSION IN THIRD TRIMESTER: ICD-10-CM

## 2023-01-27 DIAGNOSIS — O99.810 ABNORMAL GLUCOSE AFFECTING PREGNANCY: ICD-10-CM

## 2023-01-27 DIAGNOSIS — O09.43 HIGH RISK MULTIGRAVIDA IN THIRD TRIMESTER: ICD-10-CM

## 2023-01-27 DIAGNOSIS — O09.293: ICD-10-CM

## 2023-01-30 ENCOUNTER — TELEPHONE (OUTPATIENT)
Dept: OBSTETRICS AND GYNECOLOGY | Facility: CLINIC | Age: 33
End: 2023-01-30

## 2023-01-30 NOTE — TELEPHONE ENCOUNTER
Pt no showed 0900 office visit after scan with [facility] today. BPP was 8/8 according to u/s tech. Left vmail checking in with Marjan to make sure she was doing ok. Encouraged her to call back when available.

## 2023-02-09 DIAGNOSIS — O09.293: ICD-10-CM

## 2023-02-09 DIAGNOSIS — O10.913 MATERNAL CHRONIC HYPERTENSION IN THIRD TRIMESTER: ICD-10-CM

## 2023-02-09 DIAGNOSIS — O09.43 HIGH RISK MULTIGRAVIDA IN THIRD TRIMESTER: ICD-10-CM

## 2023-02-09 DIAGNOSIS — O99.810 ABNORMAL GLUCOSE AFFECTING PREGNANCY: ICD-10-CM

## 2023-02-14 ENCOUNTER — TELEPHONE (OUTPATIENT)
Dept: OBSTETRICS AND GYNECOLOGY | Facility: CLINIC | Age: 33
End: 2023-02-14
Payer: MEDICAID

## 2023-02-14 RX ORDER — IBUPROFEN 800 MG/1
800 TABLET ORAL 3 TIMES DAILY PRN
COMMUNITY
Start: 2023-02-12

## 2023-02-14 RX ORDER — OXYCODONE HYDROCHLORIDE AND ACETAMINOPHEN 5; 325 MG/1; MG/1
TABLET ORAL
COMMUNITY
Start: 2023-02-12

## 2025-08-07 ENCOUNTER — OFFICE VISIT (OUTPATIENT)
Dept: NEUROSURGERY | Facility: CLINIC | Age: 35
End: 2025-08-07
Payer: MEDICAID

## 2025-08-07 VITALS — WEIGHT: 188 LBS | HEIGHT: 62 IN | BODY MASS INDEX: 34.6 KG/M2

## 2025-08-07 DIAGNOSIS — M54.2 CERVICALGIA: Primary | ICD-10-CM

## 2025-08-07 DIAGNOSIS — E66.811 CLASS 1 OBESITY DUE TO EXCESS CALORIES WITHOUT SERIOUS COMORBIDITY WITH BODY MASS INDEX (BMI) OF 34.0 TO 34.9 IN ADULT: ICD-10-CM

## 2025-08-07 DIAGNOSIS — R20.0 NUMBNESS AND TINGLING OF RIGHT UPPER EXTREMITY: ICD-10-CM

## 2025-08-07 DIAGNOSIS — E66.09 CLASS 1 OBESITY DUE TO EXCESS CALORIES WITHOUT SERIOUS COMORBIDITY WITH BODY MASS INDEX (BMI) OF 34.0 TO 34.9 IN ADULT: ICD-10-CM

## 2025-08-07 DIAGNOSIS — R20.2 NUMBNESS AND TINGLING OF RIGHT UPPER EXTREMITY: ICD-10-CM

## 2025-08-07 RX ORDER — CYCLOBENZAPRINE HCL 10 MG
10 TABLET ORAL NIGHTLY
Qty: 30 TABLET | Refills: 0 | Status: SHIPPED | OUTPATIENT
Start: 2025-08-07

## 2025-08-07 RX ORDER — CELECOXIB 100 MG/1
100 CAPSULE ORAL 2 TIMES DAILY PRN
Qty: 60 CAPSULE | Refills: 0 | Status: SHIPPED | OUTPATIENT
Start: 2025-08-07